# Patient Record
(demographics unavailable — no encounter records)

---

## 2017-05-22 NOTE — PDOC
History of Present Illness





- General


Chief Complaint: Abscess Boil


Stated Complaint: BACK PAIN


Time Seen by Provider: 05/22/17 11:09


History Source: Patient


Exam Limitations: No Limitations





- History of Present Illness


Initial Comments: 


05/22/17 11:21


c/o pain with movement to back. Patient has chronic back pain history, has been 

seen by PMD and MRI is pending, states was never given report results. Unable 

to get ahold of this private physician and never was able to receive pain 

medication prescription from her. There is no changes in her back pain, denies 

fever, denies any problems with bowel or bladder





05/22/17 11:23








05/22/17 11:24








05/22/17 11:32





Severity: reports: mild


Pain Location: reports: none


Method of Injury: Yes: unknown


Modifying Factors: improves with: None


Associated Symptoms (Fall): denies symptoms





Past History





- Travel


Traveled outside of the country in the last 30 days: No


Close contact w/someone who was outside of country & ill: No





- Past Medical History


Allergies/Adverse Reactions: 


 Allergies











Allergy/AdvReac Type Severity Reaction Status Date / Time


 


Penicillins Allergy  Rash Verified 05/22/17 09:38











Home Medications: 


Ambulatory Orders





Cyclobenzaprine HCl [Flexeril 10 mg] 10 mg PO TID PRN #14 tablet 05/22/17 











- Surgical History


Abdominal Surgery: Yes (UTERINE FIBROID)





- Psycho/Social/Smoking Cessation Hx


Anxiety: No


Suicidal Ideation: No


Smoking Status: No


Smoking History: Never smoked


Have you smoked in the past 12 months: No


Number of Cigarettes Smoked Daily: 0


Information on smoking cessation initiated: No


Hx Alcohol Use: No


Drug/Substance Use Hx: No


Substance Use Type: None





Trauma Specific PMHX





- Complaint Specific PMHX


Back Injury: Yes (chronic)


Neck Injury: No





**Review of Systems





- Review of Systems


Able to Perform ROS?: Yes


Is the patient limited English proficient: Yes


Constitutional: Yes: Symptoms Reported, See HPI, Malaise


HEENTM: No: Symptoms Reported


Respiratory: No: Symptoms reported


ABD/GI: No: Symptoms Reported


: No: Symptoms Reported


Musculoskeletal: Yes: Symptoms Reported, See HPI, Back Pain (chronic low back 

pain, has had for many months and had extensive workup including MRI from 

private physician's office. States has been unable to obtain an appointment 

with private physician to have MRI reviewed and pain management discussed), 

Muscle Pain


Integumentary: Yes: Symptoms Reported, Lesions (to right labia ), Other


All Other Systems: Reviewed and Negative





*Physical Exam





- Vital Signs


 Last Vital Signs











Temp Pulse Resp BP Pulse Ox


 


 98.1 F   85   20   126/66   100 


 


 05/22/17 09:38  05/22/17 09:38  05/22/17 09:38  05/22/17 09:38  05/22/17 09:38














- Physical Exam


General Appearance: Yes: Nourished, Appropriately Dressed, Mild Distress


HEENT: positive: ANGELIKA, Normal ENT Inspection, Normal Voice, TMs Normal, Pharynx 

Normal


Neck: positive: Supple.  negative: Tender


Respiratory/Chest: positive: Lungs Clear, Normal Breath Sounds


Cardiovascular: positive: Regular Rate


Gastrointestinal/Abdominal: positive: Soft, Pulsatile Mass.  negative: Tender


Musculoskeletal: positive: Normal Inspection, Muscle Spasm (mild spasm and 

tense musculature for paravertebral muscle groups of lumbar and mid back, no 

true spine tenderness, range of motion walks stiffly, and able to flex at waist 

but is tender. Her vascular intact to leg).  negative: Vertebral Tenderness


Extremity: positive: Normal Capillary Refill, Normal Range of Motion


Integumentary: positive: Normal Color, Dry, Warm


Neurologic: positive: CNs II-XII NML intact, Fully Oriented, Alert, Normal Mood/

Affect, Normal Response, Motor Strength 5/5





Progress Note





- Progress Note


Progress Note: 


Chronic back pain, will add cyclobenzaprine and encourage to continue NSAIDs. 

Encourage patient to follow-up with PMD to review MRI reports and further 

treatment/medications





*DC/Admit/Observation/Transfer


Diagnosis at time of Disposition: 


Chronic back pain


Qualifiers:


 Back pain location: low back pain Back pain laterality: unspecified Sciatica 

presence: without sciatica Qualified Code(s): M54.5 - Low back pain; G89.29 - 

Other chronic pain





- Discharge Dispostion


Disposition: HOME


Condition at time of disposition: Stable


Admit: No





- Patient Instructions


Printed Discharge Instructions:  DI for Low Back Pain


Additional Instructions: 


Rest, no heavy lifting or exercise until pain is resolved





soak small boil to perineum as often as possivble until comes to head and 

drained. 


Return to emergency Department for worsening swelling, inability to open or 

drain infection or worsening symptoms





Hot soaks to neck and low back as often as possible/hot showers or Jacuzzis


No massage or therapy until spasm is gone





Continue ibuprofen 2-200 mg tablets every 6 hours for the next 3 days then as 

needed for pain and swelling


Cyclobenzaprine 1-10mg every 8 hours as needed for spasm


If not significant improvement within 24 hours with medication and rest regime, 

followup with private physician for change in medications and /or therapy.





Follow-up with private physician today to obtain results of MRI that was taken, 

and for further treatment for low back injury








- Post Discharge Activity


Work/School Note:  Back to Work

## 2017-07-02 NOTE — PDOC
History of Present Illness





- General


History Source: Patient, Family


Exam Limitations: No Limitations





- History of Present Illness


Initial Comments: 


07/02/17 13:20


The patient is a 52 year old female with past medical history of gallstones who 

presents to the ED with complaints of abdominal pain that began yesterday 

evening. The patient states that last evening she ate a chicken burger and the 

pain began. She locates the pain to her right upper quadrant, is non-radiating 

and is not accompanied by nausea or vomiting. She reports that she is scheduled 

for an ultrasound tomorrow in which her PCP will decide the plan of care for 

her gallstones. The patient denies any recent illness, fever, chills, chest pain

, shortness of breath, cough, or urinary symptoms. 





Allergies: Penicillin 


PCP: Celena Aguayo








<Tasia Rueda - Last Filed: 07/02/17 14:07>





<Shay Dixon - Last Filed: 07/02/17 14:10>





- General


Chief Complaint: Pain


Stated Complaint: ABD PAIN


Time Seen by Provider: 07/02/17 12:26





Past History





<Tasia Rueda - Last Filed: 07/02/17 14:07>





- Past Medical History


GI Disorders: Yes (GALLSTONES)





- Surgical History


Abdominal Surgery: Yes (UTERINE FIBROID)





- Psycho/Social/Smoking Cessation Hx


Anxiety: No


Suicidal Ideation: No


Smoking Status: No


Smoking History: Never smoked


Have you smoked in the past 12 months: No


Number of Cigarettes Smoked Daily: 0


Hx Alcohol Use: No


Drug/Substance Use Hx: No


Substance Use Type: None





<Shay Dixon - Last Filed: 07/02/17 14:10>





- Past Medical History


Allergies/Adverse Reactions: 


 Allergies











Allergy/AdvReac Type Severity Reaction Status Date / Time


 


Penicillins Allergy  Rash Verified 07/02/17 12:23











Home Medications: 


Ambulatory Orders





NK [No Known Home Medication]  07/02/17 











**Review of Systems





- Review of Systems


Able to Perform ROS?: Yes


Comments:: 


07/02/17 13:27


GENERAL/CONSTITUTIONAL: No fever or chills. No weakness.


HEAD, EYES, EARS, NOSE AND THROAT: No change in vision. No ear pain or 

discharge. No sore throat.


CARDIOVASCULAR: No chest pain or shortness of breath.


RESPIRATORY: No cough, wheezing, or hemoptysis.


GASTROINTESTINAL: 


Present: RUQ pain 


No nausea, vomiting, diarrhea or constipation.


GENITOURINARY: No dysuria, frequency, or change in urination.


MUSCULOSKELETAL: No joint or muscle swelling or pain. No neck or back pain.


SKIN: No rash


NEUROLOGIC: No headache, vertigo, loss of consciousness, or change in strength/

sensation.


ENDOCRINE: No increased thirst. No abnormal weight change.


HEMATOLOGIC/LYMPHATIC: No anemia, easy bleeding, or history of blood clots.


ALLERGIC/IMMUNOLOGIC: No hives or skin allergy.





All Other Systems: Reviewed and Negative





<Tasia Rueda - Last Filed: 07/02/17 14:07>





*Physical Exam





- Vital Signs


 Last Vital Signs











Temp Pulse Resp BP Pulse Ox


 


 98.4 F   108 H  18   152/80   96 


 


 07/02/17 12:21  07/02/17 12:21  07/02/17 12:21 07/02/17 12:21 07/02/17 12:21














- Physical Exam


Comments: 


07/02/17 13:28


GENERAL: Awake, alert, and fully oriented, in no acute distress


HEAD: No signs of trauma


EYES: PERRLA, EOMI, sclera anicteric, conjunctiva clear


ENT: Auricles normal inspection, hearing grossly normal, nares patent, 

oropharynx clear without 


exudates. Moist mucosa


NECK: Normal ROM, supple, no lymphadenopathy, JVD, or masses


LUNGS: Breath sounds equal, clear to auscultation bilaterally.  No wheezes, and 

no crackles


HEART: Regular rate and rhythm, normal S1 and S2, no murmurs, rubs or gallops


ABDOMEN: Epigastric and RUQ tenderness, normoactive bowel sounds.  No guarding, 

no rebound.  No masses


EXTREMITIES: Normal range of motion, no edema.  No clubbing or cyanosis. No 

cords, erythema, or tenderness


NEUROLOGICAL: Cranial nerves II through XII grossly intact.  Normal speech, 

normal gait


SKIN: Warm, Dry, normal turgor, no rashes or lesions noted.











<Tasia Rueda - Last Filed: 07/02/17 14:07>





- Vital Signs


 Last Vital Signs











Temp Pulse Resp BP Pulse Ox


 


 98.4 F   108 H  18   152/80   96 


 


 07/02/17 12:21  07/02/17 12:21 07/02/17 12:21 07/02/17 12:21  07/02/17 12:21














<Shay Dixon - Last Filed: 07/02/17 14:10>





ED Treatment Course





- LABORATORY


CBC & Chemistry Diagram: 


 07/02/17 12:50





 07/02/17 12:50





- ADDITIONAL ORDERS


Additional order review: 


 Laboratory  Results











  07/02/17 07/02/17





  12:30 12:30


 


Urine Color   Yellow


 


Urine Appearance   Clear


 


Urine pH   5.0  D


 


Urine Protein   Negative


 


Urine Glucose (UA)   Negative


 


Urine Ketones   Negative


 


Urine Blood   1+ H


 


Urine Nitrite   Negative


 


Urine Bilirubin   Negative


 


Urine Urobilinogen   Negative


 


Ur Leukocyte Esterase   Trace H


 


Urine RBC   4


 


Urine WBC   4


 


Ur Epithelial Cells   Rare


 


Hyaline Casts   1


 


Urine Mucus   Rare


 


Urine HCG, Qual  Negative 














- RADIOLOGY


Radiograph Interpretation: 


07/02/17 14:07


Gallbladder ultrasound as reviewed by Dr. Frey reports cholelithiasis. 





<Tasia Rueda - Last Filed: 07/02/17 14:07>





- LABORATORY


CBC & Chemistry Diagram: 


 07/02/17 12:50





 07/02/17 12:50





<Shay Dixon - Last Filed: 07/02/17 14:10>





*DC/Admit/Observation/Transfer





- Attestations


Scribe Attestion: 


07/02/17 13:28


Documentation prepared by Tasia Rueda, acting as medical scribe for Shay Dixon DO.





<Tasia Rueda - Last Filed: 07/02/17 14:07>





- Discharge Dispostion


Admit: No





- Attestations


Physician Attestion: 





07/02/17 12:27








I, Dr. Shay Dixon, attest that this document has been prepared under my 

direction and personally reviewed by me in its entirety.   I further attest, 

that it accurately reflects all work, treatment, procedures and medical decision

-making performed by me.  





<Shay Dixon - Last Filed: 07/02/17 14:10>


Diagnosis at time of Disposition: 


 Biliary colic





Cholelithiasis


Qualifiers:


 Cholelithiasis location: gallbladder Cholecystitis presence: without 

cholecystitis Biliary obstruction: without biliary obstruction Qualified Code(s)

: K80.20 - Calculus of gallbladder without cholecystitis without obstruction





- Discharge Dispostion


Disposition: HOME


Condition at time of disposition: Good





- Referrals


Referrals: 


Celena Chen MD [Primary Care Provider] - 


Yemi Armstrong MD [Staff Physician] - 





- Patient Instructions


Printed Discharge Instructions:  DI for Biliary Colic


Additional Instructions: 


Teresita-





Please call to make the next available appointment with Dr. Armstrong as soon as you 

can.


EAT NO FAT until you can have your gallbladder removed.





Best-


Dr. Shay Dixon

## 2017-07-13 NOTE — PDOC
History of Present Illness





- General


History Source: Patient


Exam Limitations: No Limitations





- History of Present Illness


Initial Comments: 





07/13/17 01:55


The patient is a 52 year old female with significant past medical history of 

gallstones who presents to the ED for increasing right upper quadrant pain. 

Patient presents here believing she is going to be admitted for a 

cholecystectomy. She was here on 7/2 for exacerbation of her abdominal pain. 

She presents here with a note for medical clearance. She request for the 

general surgeon to be contacted. Patient denies nausea, vomiting, or diarrhea. 

She also has complaints of dizziness secondary to her pain and decreased 

appetite. 





The patient denies fever, chills, cough, SOB, chest pain, and palpitations.





Allergies: penicillin 


Social History: No alcohol, tobacco, or drug use reported. 


Past Surgical History: tubal ligation, uterine fibroid resection


PCP: Dr. Celena Aguayo 








<Caro Glez - Last Filed: 07/13/17 06:02>





- General


History Source: Patient





<Yobany Cheema - Last Filed: 07/13/17 06:11>





- General


Chief Complaint: Pain


Stated Complaint: PAIN/RIGHT SIDE


Time Seen by Provider: 07/13/17 01:22





Past History





<Caro Glez - Last Filed: 07/13/17 06:02>





- Past Medical History


GI Disorders: Yes (GALLSTONES)





- Surgical History


Abdominal Surgery: Yes (UTERINE FIBROID)





- Psycho/Social/Smoking Cessation Hx


Anxiety: No


Suicidal Ideation: No


Smoking Status: No


Smoking History: Never smoked


Have you smoked in the past 12 months: No


Number of Cigarettes Smoked Daily: 0


Hx Alcohol Use: No


Drug/Substance Use Hx: No


Substance Use Type: None





<Yobany Cheema - Last Filed: 07/13/17 06:11>





- Past Medical History


Allergies/Adverse Reactions: 


 Allergies











Allergy/AdvReac Type Severity Reaction Status Date / Time


 


Penicillins Allergy  Rash Verified 07/13/17 00:41











Home Medications: 


Ambulatory Orders





Ondansetron [Zofran *Odt*] 4 mg SL TID #30 od.tablet 07/13/17 


Oxycodone HCl/Acetaminophen [Percocet 5-325 mg Tablet] 1 - 2 tab PO Q6H #20 

tablet MDD 4 07/13/17 











**Review of Systems





- Review of Systems


Able to Perform ROS?: Yes


Comments:: 





07/13/17 01:55


CONSTITUTIONAL:


+decreased appetite Absent: fever, no chills, no fatigue


EYES:


Absent: visual changes


ENT:


Absent: ear pain, no sore throat


CARDIOVASCULAR:


Absent: chest pain, no palpitations


RESPIRATORY:


Absent: cough, no SOB


GI:


+right upper quadrant pain Absent: no nausea, no vomiting, no constipation, no 

diarrhea


GENITOURINARY:


Absent: dysuria, no frequency, no hematuria


MUSCULOSKELETAL:


Absent: back pain, no arthralgia, no myalgia


SKIN:


Absent: rash


NEURO:


+dizziness Absent: headache








<Caro Glez - Last Filed: 07/13/17 06:02>





*Physical Exam





- Vital Signs


 Last Vital Signs











Temp Pulse Resp BP Pulse Ox


 


 97.9 F   87   18   138/80   99 


 


 07/13/17 00:37  07/13/17 00:37  07/13/17 00:37  07/13/17 00:37  07/13/17 00:37














- Physical Exam


Comments: 





07/13/17 01:55


GENERAL: 


Well-appearing, well-nourished. No apparent distress.


HEENT: 


Normocephalic, atraumatic. PERRL, EOM intact.


CARDIOVASCULAR: 


Normal S1, S2. Regular rate and rhythm.


PULMONARY: 


Clear to auscultation bilaterally.


ABDOMEN: 


Soft, non-distended, mild right upper quadrant tenderness. No rebound or 

guarding. 


EXTREMITIES: 


Normal ROM in all four extremities. No gross deformities.


SKIN: 


Warm, dry.  No rash


NEUROLOGICAL: 


No focal neurological deficits.








<Caro Glez - Last Filed: 07/13/17 06:02>





- Vital Signs


 Last Vital Signs











Temp Pulse Resp BP Pulse Ox


 


 97.9 F   87   18   138/80   99 


 


 07/13/17 00:37  07/13/17 00:37  07/13/17 00:37  07/13/17 00:37  07/13/17 00:37














<Yobany Cheema - Last Filed: 07/13/17 06:11>





**Heart Score/ECG Review





- ECG Impressions


Comment:: 





07/13/17 01:56


Sinus rhythm with occasional PVC @76bpm


Otherwise normal ECG 





<Caro Glez - Last Filed: 07/13/17 06:02>





ED Treatment Course





- LABORATORY


CBC & Chemistry Diagram: 


 07/13/17 01:50





 07/13/17 01:50





- Medications


Given in the ED: 


ED Medications














Discontinued Medications














Generic Name Dose Route Start Last Admin





  Trade Name Cheri  PRN Reason Stop Dose Admin


 


Ondansetron HCl  4 mg 07/13/17 01:35 07/13/17 01:47





  Zofran Odt -  SL 07/13/17 01:36  4 mg





  ONCE ONE   Administration


 


Oxycodone/Acetaminophen  1 combo 07/13/17 01:35 07/13/17 01:47





  Percocet 5/325 -  PO 07/13/17 01:36  1 combo





  ONCE ONE   Administration














<Caro Glez - Last Filed: 07/13/17 06:02>





- LABORATORY


CBC & Chemistry Diagram: 


 07/13/17 01:50





 07/13/17 01:50





<Yobany Cheema - Last Filed: 07/13/17 06:11>





Medical Decision Making





- Medical Decision Making


07/13/17 01:40


Paged Dr. Yemi Armstrong (via answering service)


Awaiting call back  





07/13/17 02:53


Second call placed to Dr. Armstrong (via answering service) 


Awaiting call back 





07/13/17 05:35


Third call placed to Dr. Armstrong (via answering service)


Awaiting call back





07/13/17 06:02


Fourth call placed to Dr. Armstrong (via answering service)


Awaiting call back





<Caro Glez - Last Filed: 07/13/17 06:02>





- Medical Decision Making





07/13/17 06:08


Dr. Cheema: The scribe's documentation has been prepared under my direction 

and personally reviewed by me in its entirery. I confirm that the note above 

accurately reflects all work, treatment, procedures, and medical decision 

making performed by me.





patient will follow-up w Dr. Armstrong for eventual treatment for her gall stones.  

Rx Percoet, Zofran





<Yobany Cheema - Last Filed: 07/13/17 06:11>





*DC/Admit/Observation/Transfer





- Attestations


Scribe Attestion: 





07/13/17 01:56





Documentation prepared by Caro Glez, acting as medical scribe for Yobany Cheema MD/DO.





<Caro Glez - Last Filed: 07/13/17 06:02>





- Discharge Dispostion


Admit: No





<Yobany Cheema - Last Filed: 07/13/17 06:11>


Diagnosis at time of Disposition: 


 Cholelithiasis





- Discharge Dispostion


Disposition: HOME


Condition at time of disposition: Stable





- Referrals


Referrals: 


Celena Chen MD [Primary Care Provider] - 


Yemi Armstrong MD [Staff Physician] - 





- Patient Instructions


Printed Discharge Instructions:  DI for Gallstones

## 2017-07-13 NOTE — EKG
Test Reason : 

Blood Pressure : ***/*** mmHG

Vent. Rate : 076 BPM     Atrial Rate : 076 BPM

   P-R Int : 152 ms          QRS Dur : 084 ms

    QT Int : 410 ms       P-R-T Axes : 047 007 013 degrees

   QTc Int : 461 ms

 

SINUS RHYTHM WITH OCCASIONAL PREMATURE VENTRICULAR COMPLEXES

OTHERWISE NORMAL ECG

WHEN COMPARED WITH ECG OF 18-JAN-2011 17:46,

PREMATURE VENTRICULAR COMPLEXES ARE NOW PRESENT

Confirmed by AMMY CASTELLON, GEORGE ([id number]) on 7/13/2017 1:08:06 PM

 

Referred By:             Confirmed By:GEORGE GIMENEZ MD

## 2017-07-17 NOTE — PDOC
History of Present Illness





- General


Chief Complaint: Pain, Acute


Stated Complaint: ABDOMINAL PAIN


Time Seen by Provider: 07/17/17 19:44





- History of Present Illness


Initial Comments: 


07/17/17 20:46


CHIEF COMPLAINT: abd pain





HISTORY OF PRESENT ILLNESS: 51 yo F with hx of gallstones recently diagnosed 

with cholelithiasis without cholecystitis presents to ED with similar 

complaints of RUQ pain radiating to R leg. Patient states that this pain is the 

same pain that she has had intermittently for the last 15 days.  She reports 

that she has not followed up with surgeon Dr. Armstrong because "we called him but 

he did not give us an appointment yet." She complains of nausea for the past 

few days but denies any vomiting or diarrhea. 





No recent travel or sick contacts. 





PAST MEDICAL HISTORY: Denies past medical history





FAMILY HISTORY: Denies





SOCIAL HISTORY: Denies tobacco, alcohol, illicit drug use. 





SURGICAL HISTORY: Denies





ALLERGIES: No known drug allergies





REVIEW OF SYSTEMS


General/Constitutional: Denies fever or chills. Denies weakness, weight change.





HEENT: Denies change in vision. Denies ear pain or discharge. Denies sore 

throat.





Cardiovascular: Denies chest pain or shortness of breath.





Respiratory: Denies cough, wheezing, or hemoptysis.





Gastrointestinal: Intermittent abdominal pain x 15 days. Nausea "for a few days.

" Denies vomiting, diarrhea or constipation. 





Genitourinary: Denies dysuria, frequency, or change in urination.





Musculoskeletal: Denies joint or muscle swelling or pain. Denies neck or back 

pain.





Skin and breasts: Denies rash or easy bruising.





Neurologic: Denies headache, vertigo, loss of consciousness, or loss of 

sensation.





PHYSICAL EXAM


General Appearance: Well-appearing, appropriately dressed.  No apparent 

distress.





HEENT: EOMI, PERRLA, normal ENT inspection, normal voice, TMs normal, pharynx 

normal.  No conjunctival pallor.  No photophobia, scleral icterus.





Neck: Supple.  Trachea midline. No tenderness, rigidity, carotid bruit, stridor

, lymphadenopathy, or thyromegaly. 





Respiratory/Chest: Lungs CTAB.  





Cardiovascular: RRR. S1, S2.  





Vascular Pulses: Dorsalis-Pedis (R): 2+, Dorsalis-Pedis (L): 2+





Gastrointestinal/Abdominal: Positive Chew's sign, tenderness to RUQ.  Normal 

bowel sounds.  Abdomen soft, non-distended.  No tenderness or rebound 

tenderness. No  organomegaly, pulsatile mass, guarding, hernia, hepatomegaly, 

splenomegaly.





Musculoskeletal/Extremities:  Normal inspection. FROM of all extremities, 

normal capillary refill.  Pelvis Stable.  No CVA tenderness. No tenderness to 

extremities, pedal edema, swelling, erythema or deformity.





Integumentary: Appropriate color, dry, warm.  No cyanosis, erythema, jaundice 

or rash





Neurologic: CNs II-XII intact. Fully oriented, alert.  Appropriate mood/affect. 

Motor strength 5/5.  No appreciable EOM palsy, facial droop or sensory deficit.








07/17/17 22:22











Past History





- Past Medical History


Allergies/Adverse Reactions: 


 Allergies











Allergy/AdvReac Type Severity Reaction Status Date / Time


 


Penicillins Allergy  Rash Verified 07/18/17 08:57











Home Medications: 


Ambulatory Orders





Docusate Sodium [Colace -] 100 mg PO TID #90 capsule 07/18/17 


Oxycodone HCl/Acetaminophen [Percocet 5-325 mg Tablet] 1 - 2 tab PO Q6H #28 tab 

MDD 4 07/18/17 








GI Disorders: Yes (GALLSTONES)





- Surgical History


Abdominal Surgery: Yes (UTERINE FIBROID)





- Psycho/Social/Smoking Cessation Hx


Anxiety: No


Suicidal Ideation: No


Smoking Status: No


Smoking History: Never smoked


Have you smoked in the past 12 months: No


Number of Cigarettes Smoked Daily: 0


Hx Alcohol Use: No


Drug/Substance Use Hx: No


Substance Use Type: None





*Physical Exam





- Vital Signs


 Last Vital Signs











Temp Pulse Resp BP Pulse Ox


 


 98.5 F   81   16   136/80   100 


 


 07/17/17 18:35  07/17/17 18:35  07/17/17 18:35  07/17/17 18:35  07/17/17 19:29














ED Treatment Course





- LABORATORY


CBC & Chemistry Diagram: 


 07/17/17 20:00





 07/17/17 20:00





- ADDITIONAL ORDERS


Additional order review: 


 











  07/17/17





  20:00


 


RBC  4.37


 


MCV  81.3


 


MCHC  32.1


 


RDW  16.5 H


 


MPV  8.4


 


Neutrophils %  63.7


 


Lymphocytes %  31.4


 


Monocytes %  4.0


 


Eosinophils %  0.6


 


Basophils %  0.3














- RADIOLOGY


Radiology Studies Ordered: 














 Category Date Time Status


 


 ABDOMEN US [US] Stat Ultrasound  07/17/17 20:32 Ordered














Medical Decision Making





- Medical Decision Making


07/17/17 20:53


51 yo F with hx of gallstones recently diagnosed with cholelithiasis without 

cholecystitis presents to ED with similar complaints of RUQ pain radiating to R 

leg. 





-CBC, CMP, lipase


-Abdomen US








07/17/17 21:19


Labs unremarkable. 











07/17/17 22:22


Ultrasound unchange from 4 days ago.  No acute cholecystitis.  Patient stable 

and is to f/u with surgery outpatient for non emergent cholecystectomy. 





Referral given, discussed with patient and family that she must f/u with 

surgery this week.  Patient and family verbalized understanding and agree to 

plan. 





*DC/Admit/Observation/Transfer


Diagnosis at time of Disposition: 


Cholelithiasis


Qualifiers:


 Cholelithiasis location: other site Biliary obstruction: without biliary 

obstruction Qualified Code(s): K80.80 - Other cholelithiasis without obstruction





- Discharge Dispostion


Disposition: HOME


Condition at time of disposition: Stable


Admit: No





- Referrals


Referrals: 


Celena Chen MD [Primary Care Provider] - 


James Alberto MD [Staff Physician] - 





- Patient Instructions


Printed Discharge Instructions:  DI for Gallstones


Additional Instructions: 


As discussed, you need to follow up with a surgeon as soon as possible to have 

your gallbladder removed.  Please call Dr. Alberto's office (referral provided) 

to make an appointment this week.  If his office does not accept your insurance

, I have provided a list of other surgeons at this hospital.  If you develop 

fever, vomiting, severe abdominal pain, chest pain, shortness of breath, or any 

new or worsening symptoms, please return to the ER.

## 2017-07-18 NOTE — HP
Admitting History and Physical





- Admission


Chief Complaint: Abdominal pain


History of Present Illness: 


52 female with recurrent RUQ/epigastric pain with nausea/vomiting


Patient has been in the ER multiple times for similar complaints 


On imaging was seen to have a large stone in the gallbladder


Pain consistent with recurrent biliary colic


No fevers/chills


History Source: Patient, Family Member


Limitations to Obtaining History: No Limitations





- Past Surgical History


Past Surgical History: Yes: , Tubal Ligation


Additional Past Surgical History: 


Surgery for hydrosalpinx





- Smoking History


Smoking history: Never smoked


Have you smoked in the past 12 months: No


Aproximately how many cigarettes per day: 0





- Alcohol/Substance Use


Hx Alcohol Use: No


History of Substance Use: reports: None





- Social History


ADL: Independent





Home Medications





- Allergies


Allergies/Adverse Reactions: 


 Allergies











Allergy/AdvReac Type Severity Reaction Status Date / Time


 


Penicillins Allergy  Rash Verified 17 08:57














- Home Medications


Home Medications: 


Ambulatory Orders





NK [No Known Home Medication]  17 











Family Disease History





- Family Disease History


Family History: Unremarkable





Review of Systems





- Review of Systems


Constitutional: denies: Chills, Fever


Eyes: reports: No Symptoms


HENT: reports: No Symptoms


Neck: reports: No Symptoms


Cardiovascular: denies: Chest Pain


Respiratory: denies: Cough


Gastrointestinal: reports: Abdominal Pain, Nausea.  denies: Diarrhea


Genitourinary: reports: No Symptoms


Neurological: denies: Change in LOC


Psychiatric: reports: No Symptoms


Pain Intensity: 4





Physical Examination


Vital Signs: 


 Vital Signs











Temperature  98.1 F   17 08:52


 


Pulse Rate  84   17 10:45


 


Respiratory Rate  18   17 10:45


 


Blood Pressure  110/53   17 10:45


 


O2 Sat by Pulse Oximetry (%)  98   17 10:45











Constitutional: Yes: No Distress


Eyes: Yes: WNL


HENT: Yes: WNL


Neck: Yes: Supple


Cardiovascular: Yes: Regular Rate and Rhythm


Respiratory: Yes: CTA Bilaterally


Gastrointestinal: Yes: Soft, Tenderness (Mild RUQ tenderness).  No: Distention, 

Tenderness, Rebound


Extremities: Yes: WNL


Neurological: Yes: Alert, Oriented


Labs: 


 CBC, BMP





 17 09:50 











Imaging





- Results


Ultrasound: Report Reviewed, Image Reviewed





Problem List





- Problems


(1) Recurrent biliary colic


Code(s): K80.50 - CALCULUS OF BILE DUCT W/O CHOLANGITIS OR CHOLECYST W/O OBST








Assessment/Plan


52 female with recurrent biliary colic


NPO


IV fluids


Pain control


For Robotic possible open cholecystectomy


Risks and benefits explained


Understands and agrees

## 2017-07-18 NOTE — PDOC
History of Present Illness





- General


History Source: Patient, Old Records


Exam Limitations: No Limitations





- History of Present Illness


Initial Comments: 


07/18/17 08:52


The patient is a 52-year-old woman, accompanied with her duaghter, with a 

significant past medical history of gallstones who presents to the emergency 

department for OR for a cholecystectomy with general surgeon, Dr. Yemi Armstrong. She reports experiencing her right upper quadrant pain that radiates down 

her right leg, intermittently for the past 15 days. She was recently diagnosed 

with Cholelithiasis without cholecystitis. She was supposed to follow up as 

outpatient with Dr. Armstrong but was unable to do so. She also reports associated 

symptoms of chills, headache, lightheadedness and nausea. She has been given 

Morphine and Percocet for her pain, which, as per patient, has helped 

minimally. She denies vomiting, diarrhea or any urinary complaints. She reports 

she has been NPO since 13:00 PM yesterday afternoon. 





No fever, weakness.


No chest pain, cough shortness of breath.





Allergies:  Penicillin. 


Past Surgical History: Uterine fibroid removal.


Social History: No tobacco EtOH and recreational drug use.


Primary Care Physician: Dr. Thalia Novoa





<Lakesha Mccann - Last Filed: 07/18/17 09:30>





<Adalberto Killian - Last Filed: 07/18/17 09:55>





- General


Chief Complaint: Pain


Stated Complaint: ABD PAIN


Time Seen by Provider: 07/18/17 08:46





Past History





<Lakesha Mccann - Last Filed: 07/18/17 09:30>





- Past Medical History


GI Disorders: Yes (GALLSTONES)





- Surgical History


Abdominal Surgery: Yes (UTERINE FIBROID)





- Psycho/Social/Smoking Cessation Hx


Anxiety: No


Suicidal Ideation: No


Smoking Status: No


Smoking History: Never smoked


Have you smoked in the past 12 months: No


Number of Cigarettes Smoked Daily: 0


Hx Alcohol Use: No


Drug/Substance Use Hx: No


Substance Use Type: None





<Adalberto Killian - Last Filed: 07/18/17 09:55>





- Past Medical History


Allergies/Adverse Reactions: 


 Allergies











Allergy/AdvReac Type Severity Reaction Status Date / Time


 


Penicillins Allergy  Rash Verified 07/18/17 08:57











Home Medications: 


Ambulatory Orders





NK [No Known Home Medication]  07/18/17 











**Review of Systems





- Review of Systems


Constitutional: Yes: Chills.  No: Fever


Respiratory: No: Cough, Shortness of Breath


Cardiac (ROS): No: Chest Pain


ABD/GI: Yes: Nausea.  No: Constipated, Diarrhea, Vomiting


: No: Dysuria


Integumentary: No: Rash


All Other Systems: Reviewed and Negative





<Adalberto Killian - Last Filed: 07/18/17 09:55>





*Physical Exam





- Physical Exam


Comments: 


07/18/17 08:53


GENERAL: The patient is awake, alert, and fully oriented, in no acute distress.


HEAD: Normal with no signs of trauma.


EYES: Pupils equal, round and reactive to light, extraocular movements intact, 

sclera anicteric, conjunctiva clear with no pallor.


ENT: Ears normal, nares patent, oropharynx clear without exudates.  Moist 

mucous membranes.


NECK: Normal range of motion, supple without lymphadenopathy, JVD, or masses.


LUNGS: Breath sounds equal, clear to auscultation bilaterally.  No wheeze/

crackles.


HEART: Regular rate and rhythm, normal S1 and S2 without murmur or rub.


ABDOMEN: Soft/tenderness to palpation over the right upper quadrant /

nondistended. BS wnl.  No guarding or rebound.  No palpable masses. No 

hepatosplenomegaly.


EXTREMITIES: Normal range of motion, no edema.  No clubbing or cyanosis. No 

cords, erythema, or tenderness.


NEUROLOGICAL: Cranial nerves II through XII grossly intact.  Normal speech, 

normal gait.


PSYCH: Normal mood, normal affect.


SKIN: Warm, Dry, normal turgor, no rashes or lesions noted.








<Lakesha Mccann - Last Filed: 07/18/17 09:30>





**Heart Score/ECG Review


  ** #1


ECG reviewed & interpreted by me at: 09:29


General ECG Interpretation: Sinus Rhythm, Normal Rate (84), Normal Intervals (

qtc 451), No acute ischemic changes





<Adalberto Killian - Last Filed: 07/18/17 09:55>





ED Treatment Course





- LABORATORY


CBC & Chemistry Diagram: 


 07/18/17 09:50





 07/18/17 09:50





<Adalberto Killian - Last Filed: 07/18/17 09:55>





Medical Decision Making





- Medical Decision Making


07/18/17 09:36


A portion of this note was documented by scribe services under my direction. I 

have reviewed the details of the note, within reason, and agree with the 

documentation with the following case summary and management plan written by me.





52-year-old female with no significant past medical history but known 

cholelithiasis with recurring and difficult to control biliary colic. Plan for 

OR with Dr. Armstrong today for intractable biliary colic. Ultrasound last night 

showed cholelithiasis without cholecystitis. 


labs


pain and nausea control


Admit to OR with Dr. Armstrong. 





<Adalberto Killian - Last Filed: 07/18/17 09:55>





*DC/Admit/Observation/Transfer





- Attestations


Scribe Attestion: 





07/18/17 08:53


Documentation prepared by Lakesha Mccann, acting as medical scribe for 

Adalberto Killian MD.





<Lakesha Mccann - Last Filed: 07/18/17 09:30>





- Discharge Dispostion


Admit: Yes





<Adalberto Killian - Last Filed: 07/18/17 09:55>


Diagnosis at time of Disposition: 


 Biliary colic

## 2017-07-18 NOTE — EKG
Test Reason : 

Blood Pressure : ***/*** mmHG

Vent. Rate : 084 BPM     Atrial Rate : 084 BPM

   P-R Int : 156 ms          QRS Dur : 084 ms

    QT Int : 382 ms       P-R-T Axes : 049 025 033 degrees

   QTc Int : 451 ms

 

NORMAL SINUS RHYTHM

NORMAL ECG

WHEN COMPARED WITH ECG OF 17-JUL-2017 20:23,

PREMATURE VENTRICULAR COMPLEXES ARE NO LONGER PRESENT

Confirmed by UDAY MONIQUE MD ([id number]) on 7/18/2017 3:51:48 PM

 

Referred By:             Confirmed By:UDAY MONIQUE MD

## 2017-07-18 NOTE — OP
Operative Note





- Note:


Operative Date: 07/18/17


Pre-Operative Diagnosis: Recurrent biliary colic


Operation: Robotic cholecystectomy, lysis of adhesions


Post-Operative Diagnosis: Other (Recurrent biliary colic, intraabdominal 

adhesions)


Surgeon: Yemi Armstrong


Assistant: Desi Nava


Anesthesia: General


Specimens Removed: Gallbladder


Estimated Blood Loss (mls): 10


Operative Report Dictated: Yes

## 2017-07-18 NOTE — EKG
Test Reason : 

Blood Pressure : ***/*** mmHG

Vent. Rate : 081 BPM     Atrial Rate : 081 BPM

   P-R Int : 146 ms          QRS Dur : 082 ms

    QT Int : 386 ms       P-R-T Axes : 045 013 027 degrees

   QTc Int : 448 ms

 

SINUS RHYTHM WITH OCCASIONAL PREMATURE VENTRICULAR COMPLEXES

OTHERWISE NORMAL ECG

WHEN COMPARED WITH ECG OF 13-JUL-2017 01:44,

NO SIGNIFICANT CHANGE WAS FOUND

Confirmed by UDAY MONIQUE MD ([id number]) on 7/18/2017 4:29:18 PM

 

Referred By:             Confirmed By:UDAY MONIQUE MD

## 2017-07-19 NOTE — SPEC
DATE OF OPERATION:  07/18/2017

 

SURGEON:  Yemi Armstrong MD

 

ASSISTANT:  MITESH Pichardo

 

PREOPERATIVE DIAGNOSIS:  Recurrent biliary colic.

 

POSTOPERATIVE DIAGNOSES:  Recurrent biliary colic and adhesions.

 

PROCEDURE:  Robotic multiport cholecystectomy and robotic lysis of adhesions.

 

SPECIMEN:  Gallbladder.

 

ESTIMATED BLOOD LOSS:  10 mL

 

DRAINS:  None.

 

ANESTHESIA:  GET.

 

REASON FOR PROCEDURE:  This is a 52-year-old female who kept presenting to the 
ER

with right upper quadrant epigastric pain.  She was found to have a gallstone 
in the

neck of her gallbladder on ultrasound.  Because of her recurrent symptoms, she 
was

consented for robotic, possible open cholecystectomy.  The risks and benefits 
of the

procedure were explained.

 

RISKS AND BENEFITS:  The risks and benefits of a Robotic laparoscopic, possible 
open

cholecystectomy were explained.  These included bleeding, infection, hernia, MI
, DVT,

PE, injury to surrounding structures including the liver, colon, bowel, bile 
ducts,

vessel injury, nerve injury, bile leak, and retained stones as some of the 
possible

complications.  The patient understood and signed informed consent.  

 

DESCRIPTION OF PROCEDURE:  The patient was placed supine on the operating room 
table.

 The patient underwent general endotracheal intubation.  The abdomen was 
prepped and

draped in the usual sterile fashion.   Time-out was performed.  

 

A periumbilical incision was made, and entrance into the abdominal cavity was

attained using an 8-mm robotic trocar under direct visualization with the

laparoscope.   Pneumoperitoneum was established.  Subsequently, an 8-mm robotic

trocar was placed in the left lateral abdominal wall, and two 8-mm robotic 
trocars

were placed in the right abdominal wall.  The patient was placed in reverse

Trendelenburg, right-side-up position.  

 

The robot was brought over the field and docked.  Dissection was performed at 
the

console.  The gallbladder was retracted cephalad and laterally.  In addition, 
adhesions were noted from the gallbladder to the liver, as well as to

the stomach and duodenum.  These were carefully lysed in order to freely clear 
the

gallbladder for further dissection.  Complete lysis of adhesions was performed

robotically again until adequate dissection was performed. The peritoneum was 
dissected and opened.  The

cystic duct followed by the cystic artery was carefully dissected.  Firefly

technology was used to confirm anatomy.  At this point, the cystic duct 
followed by

the cystic artery was clipped and transected.  The gallbladder was removed off 
the

liver bed using electrocautery.  Hemostasis was achieved using electrocautery.  
The

gallbladder was placed in an EndoCatch bag.  Copious irrigation and suction were

performed until clear.  The gallbladder was removed from the abdominal cavity 
and

sent off the field.  All robotic instruments were removed.  The robot was 
undocked

and removed from the field.  

 

The fascia at the gallbladder extraction site was closed using a 0 Vicryl 
suture. 

All incision sites were irrigated and Marcaine was injected.  Hemostasis of the

incision sites was noted.  All incision sites were closed using 4-0 Biosyn.  
Sterile

dressings were applied.  The patient tolerated the procedure well and was 
transferred

to the recovery room in stable condition.

 



 

 

LIS [id number]

DD: 07/18/2017 14:36

DT: 07/19/2017 11:34

Job #:  [id number]

MTDD

## 2017-07-19 NOTE — DS
Physical Exam: 


SUBJECTIVE: Patient seen and examined this morning with family member at 

bedside.  Patient is doing well, feels much better than before the surgery.  

She has some soreness around incisions, but pain is controlled.  She is 

tolerating her diet, urinating without issue, and ambulating. She denies fever, 

chills, nausea, vomiting.








OBJECTIVE:





 Vital Signs











Temperature  98 F   07/19/17 06:00


 


Pulse Rate  84   07/19/17 06:00


 


Respiratory Rate  18   07/19/17 06:00


 


Blood Pressure  98/56   07/19/17 06:00


 


O2 Sat by Pulse Oximetry (%)  99   07/18/17 21:00











PHYSICAL EXAM





GENERAL: The patient is awake, alert, and fully oriented, in no acute distress.


HEAD: Normal with no signs of trauma.


EYES: PERRL, sclera anicteric, conjunctiva clear. 


ENT: nares patent, moist mucous membranes.


LUNGS: Breath sounds equal, clear to auscultation bilaterally, no accessory 

muscle use. 


HEART: Regular rate and rhythm


ABDOMEN: Soft, nondistended, mild incisional tenderness, no guarding, no rebound

, bandaids over port site incisions clean, dry, intact


EXTREMITIES: warm, well-perfused


NEUROLOGICAL: Normal speech, gait not observed.


PSYCH: Normal mood, affect


SKIN: Warm, dry





LABS


CBC,CMP





 CBC, BMP





 07/19/17 06:30 





 07/19/17 06:30 











HOSPITAL COURSE:





Date of Admission:07/18/17





Date of Discharge: 07/19/17





The patient was admitted after presenting to the ED with recurrent RUQ/

epigastric pain with associated nausea and vomiting.  The patient had been in 

the ER multiple times for similar complaints.  On imaging, she was found to 

have a large stone in the gallbladder.  It was felt her pain was consistent 

with recurrent biliary colic.  She was admitted to Dr. Armstrong, kept NPO, and 

taken to the OR for a robotic assisted laparoscopic cholecystectomy after 

consent was obtained following explanation of risks and benefits.  The 

operation proceeded without complication, please see operative report for 

details.  The patient was then transferred to the med-surg floor for routine 

postoperative care.  The patient is now POD#1 s/p robotic laparoscopic 

cholecystectomy.  The patient is now ambulating, tolerating her diet and her 

pain is controlled with oral pain medication.  The patient will be discharged 

home with a prescription for oral pain medication and instructions to follow-up 

with Dr. Armstrong.  The discharge instructions and an oral pain management plan 

were reviewed with the patient. Above plan discussed with Dr. Armstrong and agreed.





Minutes to complete discharge: 20





Visit type





- Case Type


Case Type: ED Admission

## 2017-07-20 NOTE — SURG
Surgery First Assist Note


First Assist: Desi Nava PA-C


Date of Service: 07/18/17


Diagnosis: 


Robotic cholecystectomy, lysis of adhesions





Procedure: 








 Other (Recurrent biliary colic, intraabdominal adhesions)


I was present for the entirety of the operative procedure. For further detail, 

please refer to operative report.








Visit type





- Case Type


Case Type: ED Admission





- Emergency


Emergency Visit: Yes


Care time: The patient presented to the Emergency Department on the above date 

and was hospitalized for further evaluation of their emergent condition.





- New patient


This patient is new to me today: Yes


Date on this admission: 07/18/17





- Critical Care


Critical Care patient: No

## 2017-07-20 NOTE — PATH
Surgical Pathology Report



Patient Name:  AMANDA PACHECO

Accession #:  [id number]

[facility]. Rec. #:  [id number]                                                        

   /Age/Gender:  1964 (Age: 52) / F

Account:  [id number]                                                          

             Location: AMBULATORY SURG

Taken:  2017

Received:  2017

Reported:  2017

Physicians:  Yemi Armstrong M.D.

  



Specimen(s) Received

 GALLBLADDER 





Clinical History

Cholecystitis, cholelithiasis







Final Diagnosis

GALLBLADDER, CHOLECYSTECTOMY:

CHRONIC CHOLECYSTITIS AND CHOLELITHIASIS.





***Electronically Signed***

Cayden Acosta M.D.





Gross Description

Received in formalin, labeled "gallbladder," is a 5.4 x 2.1 x 2.0 cm.

gallbladder with a 0.2 cm. in length portion of cystic duct attached. The outer

surface is tan-green with focal defects and varies from smooth to shaggy. The

lumen contains green, tenacious bile as well as a 1.8 cm in greatest dimension

green, ovoid cholelith. The mucosa is green with gold cholesterol stippling. The

wall of the gallbladder averages 0.1 cm. in thickness. Representative sections

are submitted in one cassette.  

/2017



[place]